# Patient Record
Sex: FEMALE | Race: WHITE
[De-identification: names, ages, dates, MRNs, and addresses within clinical notes are randomized per-mention and may not be internally consistent; named-entity substitution may affect disease eponyms.]

---

## 2017-02-09 ENCOUNTER — HOSPITAL ENCOUNTER (OUTPATIENT)
Age: 47
Discharge: HOME | End: 2017-02-09
Payer: COMMERCIAL

## 2017-02-09 DIAGNOSIS — R92.1: Primary | ICD-10-CM

## 2018-04-23 ENCOUNTER — HOSPITAL ENCOUNTER (OUTPATIENT)
Dept: HOSPITAL 76 - DI.N | Age: 48
Discharge: HOME | End: 2018-04-23
Attending: PHYSICIAN ASSISTANT
Payer: COMMERCIAL

## 2018-04-23 DIAGNOSIS — Z12.31: Primary | ICD-10-CM

## 2018-04-23 PROCEDURE — 77067 SCR MAMMO BI INCL CAD: CPT

## 2018-04-24 NOTE — MAMMOGRAPHY REPORT
DIGITAL SCREENING MAMMOGRAM:  04/23/2018

 

CLINICAL INDICATION:  A 47-year-old with history of late childbearing, history of benign biopsy

for screening.

 

COMPARISON:  02/2017, 01/2016, 11/2014, 10/2013, 10/2012, 09/2011, 06/2010

 

TECHNIQUE:  Routine CC and MLO projections were obtained of the breasts.  Bilateral laterally 

exaggerated craniocaudal views were obtained.

 

FINDINGS:  The breasts again demonstrate heterogeneously dense fibroglandular parenchyma 

bilaterally.  Coarse and punctate, typically benign calcifications are present.  No suspicious 

masses, clustered microcalcifications, or regions of architectural distortion are identified.

 

IMPRESSION:  BENIGN FINDINGS.

 

RECOMMENDATION:  Routine annual screening unless otherwise clinically indicated.

 

BIRADS CATEGORY - 2 BENIGN FINDINGS.

 

STANDARD QUALIFYING STATEMENTS:

1.  This examination was reviewed with the aid of Computer-Aided Detection (CAD).

2.  A negative or benign imaging report should not delay biopsy if clinically suspicious 

findings are present.  Consider surgical consultation if warranted.  More than 5% of cancers 

are not identified by imaging.

3.  Dense breasts may obscure an underlying neoplasm.

 

 

 

DD: 04/24/2018 15:29

TD: 04/24/2018 19:54

Job #: [id number]

## 2019-07-31 ENCOUNTER — HOSPITAL ENCOUNTER (OUTPATIENT)
Dept: HOSPITAL 76 - SDS | Age: 49
Discharge: HOME | End: 2019-07-31
Attending: SURGERY
Payer: COMMERCIAL

## 2019-07-31 VITALS — DIASTOLIC BLOOD PRESSURE: 67 MMHG | SYSTOLIC BLOOD PRESSURE: 104 MMHG

## 2019-07-31 DIAGNOSIS — Z12.11: Primary | ICD-10-CM

## 2019-07-31 DIAGNOSIS — Z83.71: ICD-10-CM

## 2019-07-31 DIAGNOSIS — I49.9: ICD-10-CM

## 2019-07-31 LAB
HCG UR QL: NEGATIVE
SP GR UR STRIP.AUTO: 1.02 (ref 1–1.03)

## 2019-07-31 PROCEDURE — 45378 DIAGNOSTIC COLONOSCOPY: CPT

## 2019-07-31 PROCEDURE — 81025 URINE PREGNANCY TEST: CPT

## 2019-07-31 PROCEDURE — 0DJD8ZZ INSPECTION OF LOWER INTESTINAL TRACT, VIA NATURAL OR ARTIFICIAL OPENING ENDOSCOPIC: ICD-10-PCS | Performed by: SURGERY

## 2021-07-16 ENCOUNTER — HOSPITAL ENCOUNTER (OUTPATIENT)
Dept: HOSPITAL 76 - LAB.WCP | Age: 51
Discharge: HOME | End: 2021-07-16
Attending: PHYSICIAN ASSISTANT
Payer: COMMERCIAL

## 2021-07-16 DIAGNOSIS — Z00.00: Primary | ICD-10-CM

## 2021-07-16 LAB
ALBUMIN DIAFP-MCNC: 3.9 G/DL (ref 3.2–5.5)
ALBUMIN/GLOB SERPL: 1.4 {RATIO} (ref 1–2.2)
ALP SERPL-CCNC: 50 IU/L (ref 42–121)
ALT SERPL W P-5'-P-CCNC: 20 IU/L (ref 10–60)
ANION GAP SERPL CALCULATED.4IONS-SCNC: 10 MMOL/L (ref 6–13)
AST SERPL W P-5'-P-CCNC: 18 IU/L (ref 10–42)
BASOPHILS NFR BLD AUTO: 0.1 10^3/UL (ref 0–0.1)
BASOPHILS NFR BLD AUTO: 1.4 %
BILIRUB BLD-MCNC: 1.1 MG/DL (ref 0.2–1)
BUN SERPL-MCNC: 21 MG/DL (ref 6–20)
CALCIUM UR-MCNC: 9.1 MG/DL (ref 8.5–10.3)
CHLORIDE SERPL-SCNC: 99 MMOL/L (ref 101–111)
CHOLEST SERPL-MCNC: 219 MG/DL
CO2 SERPL-SCNC: 28 MMOL/L (ref 21–32)
CREAT SERPLBLD-SCNC: 1 MG/DL (ref 0.4–1)
EOSINOPHIL # BLD AUTO: 0.3 10^3/UL (ref 0–0.7)
EOSINOPHIL NFR BLD AUTO: 5.4 %
ERYTHROCYTE [DISTWIDTH] IN BLOOD BY AUTOMATED COUNT: 13.2 % (ref 12–15)
GFRSERPLBLD MDRD-ARVRAT: 58 ML/MIN/{1.73_M2} (ref 89–?)
GLOBULIN SER-MCNC: 2.7 G/DL (ref 2.1–4.2)
GLUCOSE SERPL-MCNC: 92 MG/DL (ref 70–100)
HCT VFR BLD AUTO: 43.9 % (ref 37–47)
HDLC SERPL-MCNC: 73 MG/DL
HDLC SERPL: 3 {RATIO} (ref ?–4.4)
HGB UR QL STRIP: 14.2 G/DL (ref 12–16)
LDLC SERPL CALC-MCNC: 129 MG/DL
LDLC/HDLC SERPL: 1.8 {RATIO} (ref ?–4.4)
LYMPHOCYTES # SPEC AUTO: 1.6 10^3/UL (ref 1.5–3.5)
LYMPHOCYTES NFR BLD AUTO: 30.5 %
MCH RBC QN AUTO: 30.1 PG (ref 27–31)
MCHC RBC AUTO-ENTMCNC: 32.3 G/DL (ref 32–36)
MCV RBC AUTO: 93 FL (ref 81–99)
MONOCYTES # BLD AUTO: 0.5 10^3/UL (ref 0–1)
MONOCYTES NFR BLD AUTO: 9.1 %
NEUTROPHILS # BLD AUTO: 2.7 10^3/UL (ref 1.5–6.6)
NEUTROPHILS # SNV AUTO: 5.1 X10^3/UL (ref 4.8–10.8)
NEUTROPHILS NFR BLD AUTO: 53.2 %
NRBC # BLD AUTO: 0 /100WBC
NRBC # BLD AUTO: 0 X10^3/UL
PDW BLD AUTO: 11.8 FL (ref 7.9–10.8)
PLATELET # BLD: 168 10^3/UL (ref 130–450)
POTASSIUM SERPL-SCNC: 4 MMOL/L (ref 3.5–5)
PROT SPEC-MCNC: 6.6 G/DL (ref 6.7–8.2)
RBC MAR: 4.72 10^6/UL (ref 4.2–5.4)
SODIUM SERPLBLD-SCNC: 137 MMOL/L (ref 135–145)
TRIGL P FAST SERPL-MCNC: 84 MG/DL
TSH SERPL-ACNC: 4.13 UIU/ML (ref 0.34–5.6)
VLDLC SERPL-SCNC: 17 MG/DL

## 2021-07-16 PROCEDURE — 84443 ASSAY THYROID STIM HORMONE: CPT

## 2021-07-16 PROCEDURE — 85025 COMPLETE CBC W/AUTO DIFF WBC: CPT

## 2021-07-16 PROCEDURE — 80061 LIPID PANEL: CPT

## 2021-07-16 PROCEDURE — 36415 COLL VENOUS BLD VENIPUNCTURE: CPT

## 2021-07-16 PROCEDURE — 80053 COMPREHEN METABOLIC PANEL: CPT

## 2021-07-16 PROCEDURE — 83721 ASSAY OF BLOOD LIPOPROTEIN: CPT

## 2023-08-17 ENCOUNTER — HOSPITAL ENCOUNTER (OUTPATIENT)
Dept: HOSPITAL 76 - DI.N | Age: 53
Discharge: HOME | End: 2023-08-17
Attending: GENERAL ACUTE CARE HOSPITAL
Payer: COMMERCIAL

## 2023-08-17 DIAGNOSIS — Z12.31: Primary | ICD-10-CM

## 2023-08-18 NOTE — MAMMOGRAPHY REPORT
BILATERAL DIGITAL SCREENING MAMMOGRAM 3D/2D: 8/17/2023

 

CLINICAL: Routine screening.  

 

Comparison is made to exams dated:  8/9/2022 mammogram, 7/30/2020 mammogram, 4/23/2018 mammogram, 2/9
/2017 mammogram, 1/22/2016 mammogram, and 7/10/2015 mammogram - [facility].  

 

Both breasts are heterogeneously dense, which may obscure small masses (category c / 51-75% glandular
 tissue).  

 

No significant masses, calcifications, or other findings are seen in either breast.  

There has been no significant interval change.

 

IMPRESSION: NEGATIVE

There is no mammographic evidence of malignancy. A 1 year screening mammogram is recommended.  

 

Based on the Tyrer Cuzick model (a risk assessment model) the patients lifetime risk is 14.7% and he
r 10 year risk is 4.3%. According to the ACR, ACS, and NCCN guidelines, an annual breast MRI exam price
ng with mammogram is recommended if the patients lifetime risk is 20% or greater.

 

 

This exam was interpreted at Station ID: [id number].  

 

NOTE: For mammograms, a report in lay terms will be sent to the patient. Approximately 15% of breast 
malignancies will not be visualized mammographically. In the management of a palpable breast mass, a 
negative mammogram must not discourage biopsy of a clinically suspicious lesion.

 

Electronically Signed By: Karen molina/zurdo:8/17/2023 16:43:20  

 

 

letter sent: No_Letter  

ACR BI-RADS Category 1: Negative 3341F

PARENCHYMAL PATTERN: (D) - The breast(s) demonstrate(s) heterogeneously dense fibroglandular demetria calderon.

BI-RADS CATEGORY: (1) - 1

Mammogram

[id number]

1 year screening

LATERALITY: (B)

## 2024-08-20 ENCOUNTER — HOSPITAL ENCOUNTER (OUTPATIENT)
Dept: HOSPITAL 76 - DI.N | Age: 54
Discharge: HOME | End: 2024-08-20
Attending: GENERAL ACUTE CARE HOSPITAL
Payer: COMMERCIAL

## 2024-08-20 DIAGNOSIS — R92.333: ICD-10-CM

## 2024-08-20 DIAGNOSIS — Z12.31: Primary | ICD-10-CM

## 2024-08-21 NOTE — MAMMOGRAPHY REPORT
BILATERAL DIGITAL SCREENING MAMMOGRAM 3D/2D: 8/20/2024

 

CLINICAL: Routine screening.  

 

Comparison is made to exams dated:  8/17/2023 mammogram, 8/9/2022 mammogram, 7/30/2020 mammogram, 4/2
3/2018 mammogram, 2/9/2017 mammogram, and 1/22/2016 mammogram - [facility].  

 

Both breasts are heterogeneously dense, which may obscure small masses (category c / 51-75% glandular
 tissue).  

 

No significant masses, calcifications, or other findings are seen in either breast.  

There has been no significant interval change.

 

IMPRESSION: NEGATIVE

There is no mammographic evidence of malignancy. A 1 year screening mammogram is recommended.  

 

Based on the Tyrer Cuzick model (a risk assessment model) the patient's lifetime risk is 14.5% and he
r 10 year risk is 4.5%. According to the ACR, ACS, and NCCN guidelines, an annual breast MRI exam price
ng with mammogram is recommended if the patient's lifetime risk is 20% or greater.

 

 

This exam was interpreted at Station ID: [id number].  

 

NOTE: For mammograms, a report in lay terms will be sent to the patient. Approximately 15% of breast 
malignancies will not be visualized mammographically. In the management of a palpable breast mass, a 
negative mammogram must not discourage biopsy of a clinically suspicious lesion.

 

Electronically Signed By: Mary Alaniz M.D., Ph.D.          

/zurdo:8/20/2024 09:10:08  

 

 

letter sent: No_Letter  

ACR BI-RADS Category 1: Negative 3341F

PARENCHYMAL PATTERN: (D) - The breast(s) demonstrate(s) heterogeneously dense fibroglandular demetria calderon.

BI-RADS CATEGORY: (1) - 1

RECOMMENDATION: (ANNUAL)  - Recommend routine annual screening mammography.

[id number]

1 year screening

LATERALITY: (B)

## 2024-09-26 ENCOUNTER — HOSPITAL ENCOUNTER (OUTPATIENT)
Dept: HOSPITAL 76 - DI | Age: 54
Discharge: HOME | End: 2024-09-26
Attending: PHYSICIAN ASSISTANT
Payer: COMMERCIAL

## 2024-09-26 DIAGNOSIS — Z78.0: ICD-10-CM

## 2024-09-26 DIAGNOSIS — M85.89: Primary | ICD-10-CM

## 2024-09-26 NOTE — DEXA REPORT
PROCEDURE: Dexa Spine and/or Hip

 

INDICATIONS: POST MENOPAUSAL

 

TECHNIQUE: Dual energy x-ray absorptiometry (DXA) was performed on a [facility].  Regions measur
ed are the AP Spine, femoral neck, and if needed forearm.

 

COMPARISON: None

  

FINDINGS:

 

Lumbar Spine: 

Bone Mineral Density: 1.017 g/cm/cm,T score:  -1.4. 

 

Left Femoral Neck:

Bone Mineral Density: 0.770 g/cm/cm, T score: -1.9. 

 

Left Hip:

Bone Mineral Density: 0.799 g/cm/cm,T score: -1.7. 

 

FRAX risk factors: 

10 year risk of major osteoporotic fracture: 7.8%

major osteoporotic fracture = hip, clinical vertebral, proximal humerus, distal forearm

10 year risk of hip fracture: 0.9%

 

(T score greater or equal to -1.0: NORMAL)

(T score from -1.1 to -2.4: OSTEOPENIA)

(T score less than or equal to -2.5 to: OSTEOPOROSIS)

 

Impression: 

By WHO criteria, this patient has low bone density (osteopenia). 

 

 

Patients with diagnosis of osteoporosis or osteopenia should have regular bone mineral density assess
ment.  For those eligible for Medicare, routine testing is allowed once every 2 years.  Testing frequ
ency can be increased for patients who have rapidly progressing disease or for those who are receivin
g medical therapy to restore bone mass.

 

Reviewed by: Shubham Cornejo MD on 9/26/2024 4:28 PM PDT

Approved by: Shubham Cornejo MD on 9/26/2024 4:28 PM PDT

 

 

Station ID:  [id number]